# Patient Record
Sex: FEMALE | Race: WHITE | Employment: UNEMPLOYED | URBAN - METROPOLITAN AREA
[De-identification: names, ages, dates, MRNs, and addresses within clinical notes are randomized per-mention and may not be internally consistent; named-entity substitution may affect disease eponyms.]

---

## 2017-02-23 DIAGNOSIS — Z00.00 ANNUAL PHYSICAL EXAM: ICD-10-CM

## 2017-02-23 DIAGNOSIS — I10 ESSENTIAL HYPERTENSION, MALIGNANT: ICD-10-CM

## 2017-02-23 DIAGNOSIS — R01.1 HEART MURMUR: ICD-10-CM

## 2017-02-23 NOTE — TELEPHONE ENCOUNTER
Patient called in stating she needs an order for labs and a mammogram and also states she needs a reorder of there following medication   Requested Prescriptions     Pending Prescriptions Disp Refills    lisinopril (PRINIVIL, ZESTRIL) 10 mg tablet 90 Tab 0     Sig: Take 1 Tab by mouth daily.

## 2017-02-24 NOTE — TELEPHONE ENCOUNTER
Patient requesting the following medication refill     Medication requesting lisinopril (Prinivil) 10mg tablet    Date last prescribed 11/29/2016    QTY 90 Refills 0    Date patient last seen 08/01/2016    Follow up appt scheduled for 03/22/2017    Please advise: Advised patient that an appointment was needed in order to receive medication refill. Patient called back and scheduled a CP.

## 2017-02-25 RX ORDER — LISINOPRIL 10 MG/1
10 TABLET ORAL DAILY
Qty: 90 TAB | Refills: 0 | Status: SHIPPED | OUTPATIENT
Start: 2017-02-25 | End: 2017-05-31 | Stop reason: SDUPTHER

## 2017-05-31 DIAGNOSIS — Z00.00 ANNUAL PHYSICAL EXAM: ICD-10-CM

## 2017-05-31 DIAGNOSIS — I10 ESSENTIAL HYPERTENSION, MALIGNANT: ICD-10-CM

## 2017-05-31 DIAGNOSIS — R01.1 HEART MURMUR: ICD-10-CM

## 2017-05-31 RX ORDER — LISINOPRIL 10 MG/1
10 TABLET ORAL DAILY
Qty: 90 TAB | Refills: 0 | Status: SHIPPED | OUTPATIENT
Start: 2017-05-31 | End: 2017-07-24 | Stop reason: SDUPTHER

## 2017-05-31 NOTE — TELEPHONE ENCOUNTER
Patient requesting the following medication refill     Medication requesting Lisinopril 10mg    Date last prescribed 02/25/2017  QTY 90 Refills 0    Date patient last seen 08/01/2016    Follow up appt scheduled for 06/06/2017    Pt is overdue for an annual physical.    Please advise

## 2017-06-02 ENCOUNTER — HOSPITAL ENCOUNTER (OUTPATIENT)
Dept: MAMMOGRAPHY | Age: 52
Discharge: HOME | End: 2017-06-02
Attending: FAMILY MEDICINE
Payer: OTHER GOVERNMENT

## 2017-06-02 DIAGNOSIS — Z12.31 VISIT FOR SCREENING MAMMOGRAM: ICD-10-CM

## 2017-06-02 PROCEDURE — 77067 SCR MAMMO BI INCL CAD: CPT

## 2017-07-20 ENCOUNTER — HOSPITAL ENCOUNTER (OUTPATIENT)
Dept: LAB | Age: 52
Discharge: HOME | End: 2017-07-20
Payer: OTHER GOVERNMENT

## 2017-07-20 DIAGNOSIS — Z78.0 MENOPAUSE: Primary | ICD-10-CM

## 2017-07-20 DIAGNOSIS — R01.1 HEART MURMUR: ICD-10-CM

## 2017-07-20 DIAGNOSIS — I10 ESSENTIAL HYPERTENSION, MALIGNANT: ICD-10-CM

## 2017-07-20 LAB
ALBUMIN SERPL BCP-MCNC: 3.9 G/DL (ref 3.4–5)
ALBUMIN/GLOB SERPL: 1.2 {RATIO} (ref 0.8–1.7)
ALP SERPL-CCNC: 84 U/L (ref 45–117)
ALT SERPL-CCNC: 71 U/L (ref 13–56)
ANION GAP BLD CALC-SCNC: 9 MMOL/L (ref 3–18)
APPEARANCE UR: CLEAR
AST SERPL W P-5'-P-CCNC: 52 U/L (ref 15–37)
BACTERIA URNS QL MICRO: ABNORMAL /HPF
BASOPHILS # BLD AUTO: 0 K/UL (ref 0–0.06)
BASOPHILS # BLD: 0 % (ref 0–2)
BILIRUB SERPL-MCNC: 0.5 MG/DL (ref 0.2–1)
BILIRUB UR QL: NEGATIVE
BUN SERPL-MCNC: 13 MG/DL (ref 7–18)
BUN/CREAT SERPL: 20 (ref 12–20)
CALCIUM SERPL-MCNC: 9.1 MG/DL (ref 8.5–10.1)
CHLORIDE SERPL-SCNC: 103 MMOL/L (ref 100–108)
CHOLEST SERPL-MCNC: 223 MG/DL
CO2 SERPL-SCNC: 26 MMOL/L (ref 21–32)
COLOR UR: YELLOW
CREAT SERPL-MCNC: 0.66 MG/DL (ref 0.6–1.3)
DIFFERENTIAL METHOD BLD: ABNORMAL
EOSINOPHIL # BLD: 0.4 K/UL (ref 0–0.4)
EOSINOPHIL NFR BLD: 4 % (ref 0–5)
EPITH CASTS URNS QL MICRO: ABNORMAL /LPF (ref 0–5)
ERYTHROCYTE [DISTWIDTH] IN BLOOD BY AUTOMATED COUNT: 12.5 % (ref 11.6–14.5)
GLOBULIN SER CALC-MCNC: 3.3 G/DL (ref 2–4)
GLUCOSE SERPL-MCNC: 103 MG/DL (ref 74–99)
GLUCOSE UR STRIP.AUTO-MCNC: NEGATIVE MG/DL
HCT VFR BLD AUTO: 38.6 % (ref 35–45)
HDLC SERPL-MCNC: 49 MG/DL (ref 40–60)
HDLC SERPL: 4.6 {RATIO} (ref 0–5)
HGB BLD-MCNC: 12.5 G/DL (ref 12–16)
HGB UR QL STRIP: NEGATIVE
KETONES UR QL STRIP.AUTO: NEGATIVE MG/DL
LDLC SERPL CALC-MCNC: 132.4 MG/DL (ref 0–100)
LEUKOCYTE ESTERASE UR QL STRIP.AUTO: ABNORMAL
LIPID PROFILE,FLP: ABNORMAL
LYMPHOCYTES # BLD AUTO: 33 % (ref 21–52)
LYMPHOCYTES # BLD: 3.1 K/UL (ref 0.9–3.6)
MCH RBC QN AUTO: 32.6 PG (ref 24–34)
MCHC RBC AUTO-ENTMCNC: 32.4 G/DL (ref 31–37)
MCV RBC AUTO: 100.8 FL (ref 74–97)
MONOCYTES # BLD: 0.8 K/UL (ref 0.05–1.2)
MONOCYTES NFR BLD AUTO: 8 % (ref 3–10)
NEUTS SEG # BLD: 5.2 K/UL (ref 1.8–8)
NEUTS SEG NFR BLD AUTO: 55 % (ref 40–73)
NITRITE UR QL STRIP.AUTO: NEGATIVE
PH UR STRIP: 5 [PH] (ref 5–8)
PLATELET # BLD AUTO: 202 K/UL (ref 135–420)
PMV BLD AUTO: 10.8 FL (ref 9.2–11.8)
POTASSIUM SERPL-SCNC: 4.5 MMOL/L (ref 3.5–5.5)
PROT SERPL-MCNC: 7.2 G/DL (ref 6.4–8.2)
PROT UR STRIP-MCNC: NEGATIVE MG/DL
RBC # BLD AUTO: 3.83 M/UL (ref 4.2–5.3)
RBC #/AREA URNS HPF: 0 /HPF (ref 0–5)
SODIUM SERPL-SCNC: 138 MMOL/L (ref 136–145)
SP GR UR REFRACTOMETRY: 1.02 (ref 1–1.03)
TRIGL SERPL-MCNC: 208 MG/DL (ref ?–150)
TSH SERPL DL<=0.05 MIU/L-ACNC: 3.6 UIU/ML (ref 0.36–3.74)
UROBILINOGEN UR QL STRIP.AUTO: 0.2 EU/DL (ref 0.2–1)
VLDLC SERPL CALC-MCNC: 41.6 MG/DL
WBC # BLD AUTO: 9.5 K/UL (ref 4.6–13.2)
WBC URNS QL MICRO: ABNORMAL /HPF (ref 0–4)

## 2017-07-20 PROCEDURE — 85025 COMPLETE CBC W/AUTO DIFF WBC: CPT | Performed by: FAMILY MEDICINE

## 2017-07-20 PROCEDURE — 84443 ASSAY THYROID STIM HORMONE: CPT | Performed by: FAMILY MEDICINE

## 2017-07-20 PROCEDURE — 80053 COMPREHEN METABOLIC PANEL: CPT | Performed by: FAMILY MEDICINE

## 2017-07-20 PROCEDURE — 81001 URINALYSIS AUTO W/SCOPE: CPT | Performed by: FAMILY MEDICINE

## 2017-07-20 PROCEDURE — 80061 LIPID PANEL: CPT | Performed by: FAMILY MEDICINE

## 2017-07-20 PROCEDURE — 36415 COLL VENOUS BLD VENIPUNCTURE: CPT | Performed by: FAMILY MEDICINE

## 2017-07-24 ENCOUNTER — HOSPITAL ENCOUNTER (OUTPATIENT)
Dept: LAB | Age: 52
Discharge: HOME | End: 2017-07-24
Payer: OTHER GOVERNMENT

## 2017-07-24 ENCOUNTER — OFFICE VISIT (OUTPATIENT)
Dept: FAMILY MEDICINE CLINIC | Age: 52
End: 2017-07-24

## 2017-07-24 VITALS
HEIGHT: 65 IN | HEART RATE: 89 BPM | WEIGHT: 223 LBS | DIASTOLIC BLOOD PRESSURE: 88 MMHG | RESPIRATION RATE: 16 BRPM | BODY MASS INDEX: 37.15 KG/M2 | TEMPERATURE: 99.2 F | SYSTOLIC BLOOD PRESSURE: 149 MMHG | OXYGEN SATURATION: 99 %

## 2017-07-24 DIAGNOSIS — R01.1 HEART MURMUR: ICD-10-CM

## 2017-07-24 DIAGNOSIS — Z01.419 WELL WOMAN EXAM WITH ROUTINE GYNECOLOGICAL EXAM: ICD-10-CM

## 2017-07-24 DIAGNOSIS — I10 ESSENTIAL HYPERTENSION, MALIGNANT: ICD-10-CM

## 2017-07-24 DIAGNOSIS — Z00.00 ANNUAL PHYSICAL EXAM: Primary | ICD-10-CM

## 2017-07-24 PROCEDURE — 87625 HPV TYPES 16 & 18 ONLY: CPT | Performed by: FAMILY MEDICINE

## 2017-07-24 PROCEDURE — 88175 CYTOPATH C/V AUTO FLUID REDO: CPT | Performed by: FAMILY MEDICINE

## 2017-07-24 PROCEDURE — 87624 HPV HI-RISK TYP POOLED RSLT: CPT | Performed by: FAMILY MEDICINE

## 2017-07-24 RX ORDER — LISINOPRIL 10 MG/1
10 TABLET ORAL DAILY
Qty: 90 TAB | Refills: 0 | Status: SHIPPED | OUTPATIENT
Start: 2017-07-24 | End: 2017-11-30 | Stop reason: SDUPTHER

## 2017-07-24 NOTE — PROGRESS NOTES
James Shepherd is a 47 y.o. female presented to clinic for an annual exam with pap. Pt c/o 3/10 hip and back pain. Pt denies any other concerns at this time. 1. Have you been to the ER, urgent care clinic since your last visit? Hospitalized since your last visit? No    2. Have you seen or consulted any other health care providers outside of the [address] [place] [address] since your last visit? Include any pap smears or colon screening.  No     Learning Assessment 8/11/2015   PRIMARY LEARNER Patient   HIGHEST LEVEL OF EDUCATION - PRIMARY LEARNER  GRADUATED HIGH SCHOOL OR GED   BARRIERS PRIMARY LEARNER READING   CO-LEARNER CAREGIVER No   PRIMARY LANGUAGE ENGLISH   LEARNER PREFERENCE PRIMARY READING   ANSWERED BY self   RELATIONSHIP SELF

## 2017-07-24 NOTE — MR AVS SNAPSHOT
Visit Information Date & Time Provider Department Dept. Phone Encounter #  
 7/24/2017 12:30 PM Eugeniakhadijahwes [address] [phone] [id number] Follow-up Instructions Return in about 1 year (around 7/24/2018) for physical, labs at next visit, EKG next visit. Follow-up and Disposition History Upcoming Health Maintenance Date Due FOBT Q 1 YEAR AGE 50-75 5/27/2016 PAP AKA CERVICAL CYTOLOGY 5/1/2017 INFLUENZA AGE 9 TO ADULT 8/1/2017 BREAST CANCER SCRN MAMMOGRAM 6/2/2019 DTaP/Tdap/Td series (2 - Td) 10/4/2021 Allergies as of 7/24/2017  Review Complete On: 7/24/2017 By: Natty Mcdowell., DO Severity Noted Reaction Type Reactions Pcn [Penicillins] Low 01/13/2010   Side Effect Rash Current Immunizations  Reviewed on 10/25/2013 Name Date Influenza Vaccine 11/4/2014  3:20 PM, 10/25/2013 Influenza Vaccine (Quad) PF 11/11/2015 Influenza Vaccine Split 10/3/2012, 10/4/2011, 11/2/2010 TDAP Vaccine 10/4/2011 Not reviewed this visit You Were Diagnosed With   
  
 Codes Comments Annual physical exam    -  Primary ICD-10-CM: Z00.00 ICD-9-CM: V70.0 Essential hypertension, malignant     ICD-10-CM: I10 
ICD-9-CM: 401.0 Heart murmur     ICD-10-CM: R01.1 ICD-9-CM: 185. 2 Well woman exam with routine gynecological exam     ICD-10-CM: D29.522 ICD-9-CM: V72.31 Vitals BP Pulse Temp Resp Height(growth percentile) Weight(growth percentile) 149/88 (BP 1 Location: Left arm, BP Patient Position: Sitting) 89 99.2 °F (37.3 °C) (Oral) 16 5' 5\" (1.651 m) 223 lb (101.2 kg) SpO2 BMI OB Status Smoking Status 99% 37.11 kg/m2 Perimenopausal Never Smoker BMI and BSA Data Body Mass Index Body Surface Area  
 37.11 kg/m 2 2.15 m 2 Preferred Pharmacy Pharmacy Name Phone Katya Fant[facility] [address] [facility]. Your Updated Medication List  
  
   
This list is accurate as of: 7/24/17  1:17 PM.  Always use your most recent med list.  
  
  
  
  
 lisinopril 10 mg tablet Commonly known as:  Vimal Elissa Take 1 Tab by mouth daily. Prescriptions Sent to Pharmacy Refills  
 lisinopril (PRINIVIL, ZESTRIL) 10 mg tablet 0 Sig: Take 1 Tab by mouth daily. Class: Normal  
 Pharmacy: Miroslava Marlyn [address] [place] [address], [address] [place] [address] #: [phone] Route: Oral  
  
We Performed the Following AMB POC EKG ROUTINE W/ 12 LEADS, INTER & REP [48053 CPT(R)] REFERRAL TO GASTROENTEROLOGY [FVC85 Custom] Comments:  
 Please evaluate patient for routine colnoscopy. 1st available is fine. Follow-up Instructions Return in about 1 year (around 7/24/2018) for physical, labs at next visit, EKG next visit. To-Do List   
 07/24/2017 Lab:  CT+NG+TV+HSV BY JACIEL   
  
 07/24/2017 Pathology:  PAP IG, APTIMA HPV AND RFX 16/18,45 ([id number]) Referral Information Referral ID Referred By Referred To  
  
 [id number] Carlo Carrera, [address] [place] [address], [facility] [address] [facility] Phone: [phone] Fax: [phone] Visits Status Start Date End Date 1 New Request 7/24/17 7/24/18 If your referral has a status of pending review or denied, additional information will be sent to support the outcome of this decision. Introducing [facility]! Dear Adenike Goldman: Thank you for requesting a CM Sistemi account. Our records indicate that you already have an active CM Sistemi account. You can access your account anytime at [email or web address] Did you know that you can access your hospital and ER discharge instructions at any time in CM Sistemi? You can also review all of your test results from your hospital stay or ER visit. Additional Information If you have questions, please visit the Frequently Asked Questions section of the Hojokihart website at [email or web address] Remember, Tongbanjie is NOT to be used for urgent needs. For medical emergencies, dial 911. Now available from your iPhone and Android! Please provide this summary of care documentation to your next provider. Your primary care clinician is listed as [id number] [place] [address]. If you have any questions after today's visit, please call [phone].

## 2017-07-24 NOTE — PROGRESS NOTES
Yousif Ellison is a 47 y.o.  female and presents for a preventive health care visit   Subjective:  Health Maintenance History  Immunizations reviewed, none indicated. Mammogram :utd , dexascan: na ,pap smear : today ,   colonoscopy:never done, Eye exam: utd ,  Chest CT: na ,    HPI ;    Patient Active Problem List    Diagnosis Date Noted    Menopause 08/18/2015    Essential hypertension, malignant 10/03/2012    Screening for malignant neoplasm of the cervix 10/04/2011    Heart murmur 01/13/2010     Current Outpatient Prescriptions   Medication Sig Dispense Refill    lisinopril (PRINIVIL, ZESTRIL) 10 mg tablet Take 1 Tab by mouth daily.  90 Tab 0     Allergies   Allergen Reactions    Pcn [Penicillins] Rash     Past Medical History:   Diagnosis Date    Essential hypertension, malignant 10/3/2012    Grandparents with Hx Breast, Prostate, Uterine and Thyroid Cancers 1/13/2010    Hx of Abnormal Pap distant 1/13/2010     Past Surgical History:   Procedure Laterality Date    HX DILATION AND CURETTAGE  4/2000 secondary to uterine fibroids    HX ORTHOPAEDIC  S/p L ankle Fx 1/2001     Family History   Problem Relation Age of Onset    Breast Cancer Paternal Grandmother 62     57s    Diabetes Mother     Heart Disease Father     Hypertension Father      Social History   Substance Use Topics    Smoking status: Never Smoker    Smokeless tobacco: Never Used    Alcohol use 0.0 oz/week     0 Standard drinks or equivalent per week       ROS       General: negative for - chills, fatigue, fever, weight change  Psych: negative for - anxiety, depression, irritability or mood swings  ENT: negative for - headaches, hearing change, nasal congestion, oral lesions, sneezing or sore throat  Heme/ Lymph: negative for - bleeding problems, bruising, pallor or swollen lymph nodes  Endo: negative for - hot flashes, polydipsia/polyuria or temperature intolerance  Resp: negative for - cough, shortness of breath or wheezing  CV: negative for - chest pain, edema or palpitations  GI: negative for - abdominal pain, change in bowel habits, constipation, diarrhea or nausea/vomiting  : negative for - dysuria, hematuria, incontinence, pelvic pain or vulvar/vaginal symptoms  MSK: negative for - joint pain, joint swelling or muscle pain  Neuro: negative for - confusion, headaches, seizures or weakness  Derm: negative for - dry skin, hair changes, rash or skin lesion changes      Objective:  Vitals:    07/24/17 1245   BP: 149/88   Pulse: 89   Resp: 16   Temp: 99.2 °F (37.3 °C)   TempSrc: Oral   SpO2: 99%   Weight: 223 lb (101.2 kg)   Height: 5' 5\" (1.651 m)   PainSc:   3   PainLoc: Hip       alert, well appearing, and in no distress, oriented to person, place, and time and normal appearing weight  Mental status - alert, oriented to person, place, and time  Eyes - pupils equal and reactive, extraocular eye movements intact  Ears - bilateral TM's and external ear canals normal  Nose - normal and patent, no erythema, discharge or polyps  Mouth - mucous membranes moist, pharynx normal without lesions  Neck - supple, no significant adenopathy  Lymphatics - no palpable lymphadenopathy, no hepatosplenomegaly  Chest - clear to auscultation, no wheezes, rales or rhonchi, symmetric air entry  Heart - normal rate, regular rhythm, normal S1, S2, no murmurs, rubs, clicks or gallops  Abdomen - soft, nontender, nondistended, no masses or organomegaly  Breasts - breasts appear normal, no suspicious masses, no skin or nipple changes or axillary nodes  Pelvic - normal external genitalia, vulva, vagina, cervix, uterus and adnexa  Rectal - normal rectal, no masses  Back exam - full range of motion, no tenderness, palpable spasm or pain on motion  Neurological - alert, oriented, normal speech, no focal findings or movement disorder noted  Musculoskeletal - no joint tenderness, deformity or swelling  Extremities - peripheral pulses normal, no pedal edema, no clubbing or cyanosis  Skin - normal coloration and turgor, no rashes, no suspicious skin lesions noted        LABS  Component      Latest Ref Rng & Units 7/20/2017 7/20/2017 7/20/2017 7/20/2017           8:49 AM  8:49 AM  8:49 AM  8:49 AM   WBC      4.6 - 13.2 K/uL       RBC      4.20 - 5.30 M/uL       HGB      12.0 - 16.0 g/dL       HCT      35.0 - 45.0 %       MCV      74.0 - 97.0 FL       MCH      24.0 - 34.0 PG       MCHC      31.0 - 37.0 g/dL       RDW      11.6 - 14.5 %       PLATELET      954 - 225 K/uL       MPV      9.2 - 11.8 FL       NEUTROPHILS      40 - 73 %       LYMPHOCYTES      21 - 52 %       MONOCYTES      3 - 10 %       EOSINOPHILS      0 - 5 %       BASOPHILS      0 - 2 %       ABS. NEUTROPHILS      1.8 - 8.0 K/UL       ABS. LYMPHOCYTES      0.9 - 3.6 K/UL       ABS. MONOCYTES      0.05 - 1.2 K/UL       ABS. EOSINOPHILS      0.0 - 0.4 K/UL       ABS. BASOPHILS      0.0 - 0.06 K/UL       DF             Sodium      136 - 145 mmol/L   138    Potassium      3.5 - 5.5 mmol/L   4.5    Chloride      100 - 108 mmol/L   103    CO2      21 - 32 mmol/L   26    Anion gap      3.0 - 18 mmol/L   9    Glucose      74 - 99 mg/dL   103 (H)    BUN      7.0 - 18 MG/DL   13    Creatinine      0.6 - 1.3 MG/DL   0.66    BUN/Creatinine ratio      12 - 20     20    GFR est AA      >60 ml/min/1.73m2   >60    GFR est non-AA      >60 ml/min/1.73m2   >60    Calcium      8.5 - 10.1 MG/DL   9.1    Bilirubin, total      0.2 - 1.0 MG/DL   0.5    ALT (SGPT)      13 - 56 U/L   71 (H)    AST      15 - 37 U/L   52 (H)    Alk.  phosphatase      45 - 117 U/L   84    Protein, total      6.4 - 8.2 g/dL   7.2    Albumin      3.4 - 5.0 g/dL   3.9    Globulin      2.0 - 4.0 g/dL   3.3    A-G Ratio      0.8 - 1.7     1.2    Color       YELLOW      Appearance       CLEAR      Specific gravity      1.005 - 1.030   1.023      pH (UA)      5.0 - 8.0   5.0      Protein      NEG mg/dL NEGATIVE      Glucose      NEG mg/dL NEGATIVE      Ketone NEG mg/dL NEGATIVE      Bilirubin      NEG   NEGATIVE      Blood      NEG   NEGATIVE      Urobilinogen      0.2 - 1.0 EU/dL 0.2      Nitrites      NEG   NEGATIVE      Leukocyte Esterase      NEG   MODERATE (A)      Cholesterol, total      <200 MG/DL    223 (H)   Triglyceride      <150 MG/DL    208 (H)   HDL Cholesterol      40 - 60 MG/DL    49   LDL, calculated      0 - 100 MG/DL    132.4 (H)   VLDL, calculated      MG/DL    41.6   CHOL/HDL Ratio      0 - 5.0      4.6   TSH      0.36 - 3.74 uIU/mL  3.60       Component      Latest Ref Rng & Units 7/20/2017           8:49 AM   WBC      4.6 - 13.2 K/uL 9.5   RBC      4.20 - 5.30 M/uL 3.83 (L)   HGB      12.0 - 16.0 g/dL 12.5   HCT      35.0 - 45.0 % 38.6   MCV      74.0 - 97.0 .8 (H)   MCH      24.0 - 34.0 PG 32.6   MCHC      31.0 - 37.0 g/dL 32.4   RDW      11.6 - 14.5 % 12.5   PLATELET      285 - 517 K/uL 202   MPV      9.2 - 11.8 FL 10.8   NEUTROPHILS      40 - 73 % 55   LYMPHOCYTES      21 - 52 % 33   MONOCYTES      3 - 10 % 8   EOSINOPHILS      0 - 5 % 4   BASOPHILS      0 - 2 % 0   ABS. NEUTROPHILS      1.8 - 8.0 K/UL 5.2   ABS. LYMPHOCYTES      0.9 - 3.6 K/UL 3.1   ABS. MONOCYTES      0.05 - 1.2 K/UL 0.8   ABS. EOSINOPHILS      0.0 - 0.4 K/UL 0.4   ABS. BASOPHILS      0.0 - 0.06 K/UL 0.0   DF       AUTOMATED   Sodium      136 - 145 mmol/L    Potassium      3.5 - 5.5 mmol/L    Chloride      100 - 108 mmol/L    CO2      21 - 32 mmol/L    Anion gap      3.0 - 18 mmol/L    Glucose      74 - 99 mg/dL    BUN      7.0 - 18 MG/DL    Creatinine      0.6 - 1.3 MG/DL    BUN/Creatinine ratio      12 - 20      GFR est AA      >60 ml/min/1.73m2    GFR est non-AA      >60 ml/min/1.73m2    Calcium      8.5 - 10.1 MG/DL    Bilirubin, total      0.2 - 1.0 MG/DL    ALT (SGPT)      13 - 56 U/L    AST      15 - 37 U/L    Alk.  phosphatase      45 - 117 U/L    Protein, total      6.4 - 8.2 g/dL    Albumin      3.4 - 5.0 g/dL    Globulin      2.0 - 4.0 g/dL    A-G Ratio 0.8 - 1.7      Color          Appearance          Specific gravity      1.005 - 1.030      pH (UA)      5.0 - 8.0      Protein      NEG mg/dL    Glucose      NEG mg/dL    Ketone      NEG mg/dL    Bilirubin      NEG      Blood      NEG      Urobilinogen      0.2 - 1.0 EU/dL    Nitrites      NEG      Leukocyte Esterase      NEG      Cholesterol, total      <200 MG/DL    Triglyceride      <150 MG/DL    HDL Cholesterol      40 - 60 MG/DL    LDL, calculated      0 - 100 MG/DL    VLDL, calculated      MG/DL    CHOL/HDL Ratio      0 - 5.0      TSH      0.36 - 3.74 uIU/mL      TESTS  ekg  nsr      Assessment/Plan:    Health Maintenance up to date. Recommend f/u physical 1 year. Routine screening labs/tests recommended prior to next physical.              I have discussed the diagnosis with the patient and the intended plan as seen in the above orders. The patient has received an after-visit summary and questions were answered concerning future plans. I have discussed medication side effects and warnings with the patient as well. I have reviewed the plan of care with the patient, accepted their input and they are in agreement with the treatment goals. Follow-up Disposition:  Return in about 1 year (around 7/24/2018) for physical, labs at next visit, EKG next visit.        -------------------------------------------------------------------------------------------------------------------    Problem Assessment  and also with   Chief Complaint   Patient presents with    Hypertension         HPI ;  Cardiovascular Review:  The patient has hypertension. Diet and Lifestyle: not attempting to follow a low fat, low cholesterol diet, not attempting to follow a low sodium diet  Home BP Monitoring: is not measured at home. Pertinent ROS: taking medications as instructed, no medication side effects noted, no TIA's, no chest pain on exertion, no dyspnea on exertion, no swelling of ankles.      Additional Concerns: Assessment/Plan:      Hypertension - stable  Diagnoses and all orders for this visit:    1. Annual physical exam  -     AMB POC EKG ROUTINE W/ 12 LEADS, INTER & REP  -     PAP IG, APTIMA HPV AND RFX 16/18,45 ([id number]); Future  -     CT+NG+TV+HSV BY JACIEL; Future  -     lisinopril (PRINIVIL, ZESTRIL) 10 mg tablet; Take 1 Tab by mouth daily.  -     REFERRAL TO GASTROENTEROLOGY    2. Essential hypertension, malignant  -     AMB POC EKG ROUTINE W/ 12 LEADS, INTER & REP  -     PAP IG, APTIMA HPV AND RFX 16/18,45 ([id number]); Future  -     CT+NG+TV+HSV BY JACIEL; Future  -     lisinopril (PRINIVIL, ZESTRIL) 10 mg tablet; Take 1 Tab by mouth daily.  -     REFERRAL TO GASTROENTEROLOGY    3. Heart murmur  -     AMB POC EKG ROUTINE W/ 12 LEADS, INTER & REP  -     PAP IG, APTIMA HPV AND RFX 16/18,45 ([id number]); Future  -     CT+NG+TV+HSV BY JACIEL; Future  -     lisinopril (PRINIVIL, ZESTRIL) 10 mg tablet; Take 1 Tab by mouth daily.  -     REFERRAL TO GASTROENTEROLOGY    4. Well woman exam with routine gynecological exam  -     AMB POC EKG ROUTINE W/ 12 LEADS, INTER & REP  -     PAP IG, APTIMA HPV AND RFX 16/18,45 ([id number]); Future  -     CT+NG+TV+HSV BY JACIEL; Future  -     lisinopril (PRINIVIL, ZESTRIL) 10 mg tablet; Take 1 Tab by mouth daily.  -     REFERRAL TO GASTROENTEROLOGY    note for contraception she has IUD place 2/2016. She is still having menstrual periods and thus fertile. Will f/u 1 yr.           Lab review: labs are reviewed, up to date and normal

## 2017-07-27 LAB
C TRACH RRNA SPEC QL NAA+PROBE: NEGATIVE
HSV1 DNA SPEC QL NAA+PROBE: NEGATIVE
HSV2 DNA SPEC QL NAA+PROBE: NEGATIVE
N GONORRHOEA RRNA SPEC QL NAA+PROBE: NEGATIVE
T VAGINALIS RRNA SPEC QL NAA+PROBE: NEGATIVE

## 2017-10-31 ENCOUNTER — OFFICE VISIT (OUTPATIENT)
Dept: FAMILY MEDICINE CLINIC | Age: 52
End: 2017-10-31

## 2017-10-31 ENCOUNTER — HOSPITAL ENCOUNTER (OUTPATIENT)
Dept: LAB | Age: 52
Discharge: HOME | End: 2017-10-31
Payer: OTHER GOVERNMENT

## 2017-10-31 ENCOUNTER — TELEPHONE (OUTPATIENT)
Dept: FAMILY MEDICINE CLINIC | Age: 52
End: 2017-10-31

## 2017-10-31 DIAGNOSIS — R10.2 PELVIC PAIN: Primary | ICD-10-CM

## 2017-10-31 DIAGNOSIS — R10.32 LEFT LOWER QUADRANT PAIN: ICD-10-CM

## 2017-10-31 DIAGNOSIS — R10.2 PELVIC PAIN: ICD-10-CM

## 2017-10-31 LAB
ALBUMIN SERPL-MCNC: 3.6 G/DL (ref 3.4–5)
ALBUMIN/GLOB SERPL: 1 {RATIO} (ref 0.8–1.7)
ALP SERPL-CCNC: 114 U/L (ref 45–117)
ALT SERPL-CCNC: 69 U/L (ref 13–56)
ANION GAP SERPL CALC-SCNC: 9 MMOL/L (ref 3–18)
APPEARANCE UR: CLEAR
AST SERPL-CCNC: 38 U/L (ref 15–37)
BACTERIA URNS QL MICRO: NEGATIVE /HPF
BASOPHILS # BLD: 0 K/UL (ref 0–0.06)
BASOPHILS NFR BLD: 0 % (ref 0–2)
BILIRUB SERPL-MCNC: 0.4 MG/DL (ref 0.2–1)
BILIRUB UR QL: NEGATIVE
BUN SERPL-MCNC: 8 MG/DL (ref 7–18)
BUN/CREAT SERPL: 11 (ref 12–20)
CALCIUM SERPL-MCNC: 8.7 MG/DL (ref 8.5–10.1)
CHLORIDE SERPL-SCNC: 102 MMOL/L (ref 100–108)
CO2 SERPL-SCNC: 27 MMOL/L (ref 21–32)
COLOR UR: YELLOW
CREAT SERPL-MCNC: 0.73 MG/DL (ref 0.6–1.3)
DIFFERENTIAL METHOD BLD: ABNORMAL
EOSINOPHIL # BLD: 0.4 K/UL (ref 0–0.4)
EOSINOPHIL NFR BLD: 3 % (ref 0–5)
EPITH CASTS URNS QL MICRO: NORMAL /LPF (ref 0–5)
ERYTHROCYTE [DISTWIDTH] IN BLOOD BY AUTOMATED COUNT: 12.4 % (ref 11.6–14.5)
GLOBULIN SER CALC-MCNC: 3.7 G/DL (ref 2–4)
GLUCOSE SERPL-MCNC: 112 MG/DL (ref 74–99)
GLUCOSE UR STRIP.AUTO-MCNC: NEGATIVE MG/DL
HCG SERPL QL: NEGATIVE
HCT VFR BLD AUTO: 38.8 % (ref 35–45)
HGB BLD-MCNC: 12.3 G/DL (ref 12–16)
HGB UR QL STRIP: NEGATIVE
KETONES UR QL STRIP.AUTO: NEGATIVE MG/DL
LEUKOCYTE ESTERASE UR QL STRIP.AUTO: ABNORMAL
LYMPHOCYTES # BLD: 3 K/UL (ref 0.9–3.6)
LYMPHOCYTES NFR BLD: 25 % (ref 21–52)
MCH RBC QN AUTO: 32.2 PG (ref 24–34)
MCHC RBC AUTO-ENTMCNC: 31.7 G/DL (ref 31–37)
MCV RBC AUTO: 101.6 FL (ref 74–97)
MONOCYTES # BLD: 0.9 K/UL (ref 0.05–1.2)
MONOCYTES NFR BLD: 7 % (ref 3–10)
NEUTS SEG # BLD: 7.7 K/UL (ref 1.8–8)
NEUTS SEG NFR BLD: 65 % (ref 40–73)
NITRITE UR QL STRIP.AUTO: NEGATIVE
PH UR STRIP: 5.5 [PH] (ref 5–8)
PLATELET # BLD AUTO: 252 K/UL (ref 135–420)
PMV BLD AUTO: 11.4 FL (ref 9.2–11.8)
POTASSIUM SERPL-SCNC: 4 MMOL/L (ref 3.5–5.5)
PROT SERPL-MCNC: 7.3 G/DL (ref 6.4–8.2)
PROT UR STRIP-MCNC: NEGATIVE MG/DL
RBC # BLD AUTO: 3.82 M/UL (ref 4.2–5.3)
RBC #/AREA URNS HPF: 0 /HPF (ref 0–5)
SODIUM SERPL-SCNC: 138 MMOL/L (ref 136–145)
SP GR UR REFRACTOMETRY: 1.01 (ref 1–1.03)
UROBILINOGEN UR QL STRIP.AUTO: 0.2 EU/DL (ref 0.2–1)
WBC # BLD AUTO: 11.9 K/UL (ref 4.6–13.2)
WBC URNS QL MICRO: NORMAL /HPF (ref 0–4)

## 2017-10-31 PROCEDURE — 87086 URINE CULTURE/COLONY COUNT: CPT | Performed by: FAMILY MEDICINE

## 2017-10-31 PROCEDURE — 36415 COLL VENOUS BLD VENIPUNCTURE: CPT | Performed by: FAMILY MEDICINE

## 2017-10-31 PROCEDURE — 84703 CHORIONIC GONADOTROPIN ASSAY: CPT | Performed by: FAMILY MEDICINE

## 2017-10-31 PROCEDURE — 80053 COMPREHEN METABOLIC PANEL: CPT | Performed by: FAMILY MEDICINE

## 2017-10-31 PROCEDURE — 85025 COMPLETE CBC W/AUTO DIFF WBC: CPT | Performed by: FAMILY MEDICINE

## 2017-10-31 PROCEDURE — 81001 URINALYSIS AUTO W/SCOPE: CPT | Performed by: FAMILY MEDICINE

## 2017-10-31 RX ORDER — HYDROCODONE BITARTRATE AND ACETAMINOPHEN 5; 325 MG/1; MG/1
1 TABLET ORAL
Qty: 15 TAB | Refills: 0 | Status: SHIPPED | OUTPATIENT
Start: 2017-10-31 | End: 2017-11-30 | Stop reason: SDUPTHER

## 2017-10-31 RX ORDER — NAPROXEN SODIUM 550 MG/1
550 TABLET ORAL 2 TIMES DAILY WITH MEALS
Qty: 10 TAB | Refills: 1 | Status: SHIPPED | OUTPATIENT
Start: 2017-10-31 | End: 2017-11-30 | Stop reason: SDUPTHER

## 2017-10-31 NOTE — TELEPHONE ENCOUNTER
Spoke with patient to recommend that she have the requested ultrasound done at a [facility] as she would not need a prior authorization or we can move forward with obtaining the prior authorization with Judith in which she can keep the scheduled appointment on 11/10/17. Patient agreed to wait for the scheduled appointment on 11/10/17 at [facility].

## 2017-10-31 NOTE — MR AVS SNAPSHOT
Visit Information Date & Time Provider Department Dept. Phone Encounter #  
 10/31/2017 10:30 AM Ese Marrero [phone] [id number] Follow-up Instructions Return in about 2 days (around 11/2/2017) for rov, xrays prior, labs prior. Upcoming Health Maintenance Date Due FOBT Q 1 YEAR AGE 50-75 5/27/2016 INFLUENZA AGE 9 TO ADULT 8/1/2017 BREAST CANCER SCRN MAMMOGRAM 6/2/2019 PAP AKA CERVICAL CYTOLOGY 7/24/2020 DTaP/Tdap/Td series (2 - Td) 10/4/2021 Allergies as of 10/31/2017  Review Complete On: 7/24/2017 By: Meek Padilla., DO Severity Noted Reaction Type Reactions Pcn [Penicillins] Low 01/13/2010   Side Effect Rash Current Immunizations  Reviewed on 10/25/2013 Name Date Influenza Vaccine 11/4/2014  3:20 PM, 10/25/2013 Influenza Vaccine (Quad) PF 11/11/2015 Influenza Vaccine Split 10/3/2012, 10/4/2011, 11/2/2010 TDAP Vaccine 10/4/2011 Not reviewed this visit You Were Diagnosed With   
  
 Codes Comments Pelvic pain    -  Primary ICD-10-CM: R10.2 ICD-9-CM: GPI7861 Left lower quadrant pain     ICD-10-CM: R10.32 
ICD-9-CM: 789.04 Vitals OB Status Smoking Status Perimenopausal Never Smoker Preferred Pharmacy Pharmacy Name Phone Radha Hives [address] [facility] 97. Your Updated Medication List  
  
   
This list is accurate as of: 10/31/17 10:35 AM.  Always use your most recent med list.  
  
  
  
  
 HYDROcodone-acetaminophen 5-325 mg per tablet Commonly known as:  Jay Campos Take 1 Tab by mouth every eight (8) hours as needed for Pain. Max Daily Amount: 3 Tabs. lisinopril 10 mg tablet Commonly known as:  Alexander Quintana Take 1 Tab by mouth daily. naproxen sodium 550 mg tablet Commonly known as:  Maxine Miracle Take 1 Tab by mouth two (2) times daily (with meals). Prescriptions Printed Refills HYDROcodone-acetaminophen (NORCO) 5-325 mg per tablet 0 Sig: Take 1 Tab by mouth every eight (8) hours as needed for Pain. Max Daily Amount: 3 Tabs. Class: Print Route: Oral  
  
Prescriptions Sent to Pharmacy Refills  
 naproxen sodium (ANAPROX) 550 mg tablet 1 Sig: Take 1 Tab by mouth two (2) times daily (with meals). Class: Normal  
 Pharmacy: Sheba Guthrie [address] [place] [address], [address] [facility] #: [phone] Route: Oral  
  
Follow-up Instructions Return in about 2 days (around 11/2/2017) for rov, xrays prior, labs prior. To-Do List   
 10/31/2017 Lab:  CBC WITH AUTOMATED DIFF   
  
 10/31/2017 Microbiology:  CULTURE, URINE   
  
 10/31/2017 Lab:  HCG QL SERUM   
  
 10/31/2017 Lab:  METABOLIC PANEL, COMPREHENSIVE   
  
 10/31/2017 Lab:  URINALYSIS W/ RFLX MICROSCOPIC   
  
 10/31/2017 Imaging:  US ABD LTD   
  
 10/31/2017 Imaging:  US PELV NON OBS Introducing [facility]! Dear Hang Melendrez: Thank you for requesting a Zoodles account. Our records indicate that you already have an active Zoodles account. You can access your account anytime at [email or web address] Systems Did you know that you can access your hospital and ER discharge instructions at any time in Zoodles? You can also review all of your test results from your hospital stay or ER visit. Additional Information If you have questions, please visit the Frequently Asked Questions section of the Zoodles website at [email or web address] Remember, Zoodles is NOT to be used for urgent needs. For medical emergencies, dial 911. Now available from your iPhone and Android! Please provide this summary of care documentation to your next provider. Your primary care clinician is listed as [address] [facility] [address]. If you have any questions after today's visit, please call [phone].

## 2017-10-31 NOTE — PROGRESS NOTES
Loki Sow is a 47 y.o.  female and presents with    Chief Complaint   Patient presents with    Abdominal Pain           Subjective: Additional Concerns: gradual onset and worsening of LLQ abd and pelvic pain. No menses x 5 mo. Has IUD  No NVDC. No fever or chills. Patient Active Problem List    Diagnosis Date Noted    Menopause 08/18/2015    Essential hypertension, malignant 10/03/2012    Screening for malignant neoplasm of the cervix 10/04/2011    Heart murmur 01/13/2010     Current Outpatient Prescriptions   Medication Sig Dispense Refill    naproxen sodium (ANAPROX) 550 mg tablet Take 1 Tab by mouth two (2) times daily (with meals). 10 Tab 1    HYDROcodone-acetaminophen (NORCO) 5-325 mg per tablet Take 1 Tab by mouth every eight (8) hours as needed for Pain. Max Daily Amount: 3 Tabs. 15 Tab 0    lisinopril (PRINIVIL, ZESTRIL) 10 mg tablet Take 1 Tab by mouth daily. 90 Tab 0     Allergies   Allergen Reactions    Pcn [Penicillins] Rash     Past Medical History:   Diagnosis Date    Essential hypertension, malignant 10/3/2012    Grandparents with Hx Breast, Prostate, Uterine and Thyroid Cancers 1/13/2010    Hx of Abnormal Pap distant 1/13/2010     Past Surgical History:   Procedure Laterality Date    HX DILATION AND CURETTAGE  4/2000 secondary to uterine fibroids    HX ORTHOPAEDIC  S/p L ankle Fx 1/2001     Family History   Problem Relation Age of Onset    Breast Cancer Paternal Grandmother 62     57s    Diabetes Mother     Heart Disease Father     Hypertension Father      Social History   Substance Use Topics    Smoking status: Never Smoker    Smokeless tobacco: Never Used    Alcohol use 0.0 oz/week     0 Standard drinks or equivalent per week       ROS       All other systems reviewed and are negative. Objective: There were no vitals filed for this visit.                alert, well appearing, and in no distress, oriented to person, place, and time and normal appearing weight  Heart - normal rate, regular rhythm, normal S1, S2, no murmurs, rubs, clicks or gallops  Abdomen - tenderness noted LLQ. No masses , rebound, guarding, rigidity         LABS     TESTS      Assessment/Plan:    llq abd and pelvic pain. Check us, labs  Try anaprox and vicodin and rest and f/u  2 days    Lab review:     Diagnoses and all orders for this visit:    1. Pelvic pain  -     US ABD LTD; Future  -     US PELV NON OBS; Future  -     CBC WITH AUTOMATED DIFF; Future  -     METABOLIC PANEL, COMPREHENSIVE; Future  -     URINALYSIS W/ RFLX MICROSCOPIC; Future  -     CULTURE, URINE; Future  -     HCG QL SERUM; Future    2. Left lower quadrant pain  -     US ABD LTD; Future  -     US PELV NON OBS; Future  -     CBC WITH AUTOMATED DIFF; Future  -     METABOLIC PANEL, COMPREHENSIVE; Future  -     URINALYSIS W/ RFLX MICROSCOPIC; Future  -     CULTURE, URINE; Future  -     HCG QL SERUM; Future    Other orders  -     naproxen sodium (ANAPROX) 550 mg tablet; Take 1 Tab by mouth two (2) times daily (with meals). -     HYDROcodone-acetaminophen (NORCO) 5-325 mg per tablet; Take 1 Tab by mouth every eight (8) hours as needed for Pain. Max Daily Amount: 3 Tabs. I have discussed the diagnosis with the patient and the intended plan as seen in the above orders. The patient has received an after-visit summary and questions were answered concerning future plans. I have discussed medication side effects and warnings with the patient as well. I have reviewed the plan of care with the patient, accepted their input and they are in agreement with the treatment goals. Follow-up Disposition:  Return in about 2 days (around 11/2/2017) for rov, xrays prior, labs prior.

## 2017-10-31 NOTE — TELEPHONE ENCOUNTER
Pt called stating that she needs a prior auth for her ultra sound and [facility] can't get her in to do one until 11/10. Please advise.

## 2017-11-01 LAB
BACTERIA SPEC CULT: NORMAL
SERVICE CMNT-IMP: NORMAL

## 2017-11-10 ENCOUNTER — HOSPITAL ENCOUNTER (OUTPATIENT)
Dept: ULTRASOUND IMAGING | Age: 52
Discharge: HOME | End: 2017-11-10
Attending: FAMILY MEDICINE
Payer: OTHER GOVERNMENT

## 2017-11-10 DIAGNOSIS — R10.2 PELVIC PAIN: ICD-10-CM

## 2017-11-10 DIAGNOSIS — R10.32 LEFT LOWER QUADRANT PAIN: ICD-10-CM

## 2017-11-10 PROCEDURE — 76705 ECHO EXAM OF ABDOMEN: CPT

## 2017-11-10 PROCEDURE — 76830 TRANSVAGINAL US NON-OB: CPT

## 2017-11-27 ENCOUNTER — TELEPHONE (OUTPATIENT)
Dept: FAMILY MEDICINE CLINIC | Age: 52
End: 2017-11-27

## 2017-11-27 ENCOUNTER — DOCUMENTATION ONLY (OUTPATIENT)
Dept: FAMILY MEDICINE CLINIC | Age: 52
End: 2017-11-27

## 2017-11-27 NOTE — PROGRESS NOTES
Dr. Mathewvive Abdulkadir patient was a no show on 11/27/17. Letter #1 was sent on 11/27/17. NADEGE STEELE.

## 2017-11-27 NOTE — LETTER
11/27/2017 Kath Hung [address] [facility] Dear Ms. Kath Hung, We had an appointment reserved for you today and were concerned when you did not show or call within 24 hours to cancel the appointment. Our policy is to call patients two days prior to their appointment to remind them of the date and time. We perform these calls as a courtesy to our patients and to allow us the opportunity to rebook the time slot should the appointment not be necessary. Recognizing that everyones time is valuable and that appointment time is limited, we ask that you provide 24 hours notice if you are unable to keep your appointment. Please call us at your earliest convenience to reschedule your appointment as your provider felt it was important to see you. Thank you for your anticipated cooperation. [address] [place] [address] [id number] [phone]

## 2017-11-30 ENCOUNTER — OFFICE VISIT (OUTPATIENT)
Dept: FAMILY MEDICINE CLINIC | Age: 52
End: 2017-11-30

## 2017-11-30 VITALS
WEIGHT: 234.6 LBS | DIASTOLIC BLOOD PRESSURE: 80 MMHG | HEIGHT: 65 IN | BODY MASS INDEX: 39.09 KG/M2 | TEMPERATURE: 97.4 F | SYSTOLIC BLOOD PRESSURE: 112 MMHG | RESPIRATION RATE: 12 BRPM | HEART RATE: 96 BPM | OXYGEN SATURATION: 99 %

## 2017-11-30 DIAGNOSIS — I10 ESSENTIAL HYPERTENSION, MALIGNANT: Primary | ICD-10-CM

## 2017-11-30 DIAGNOSIS — R10.84 GENERALIZED ABDOMINAL PAIN: ICD-10-CM

## 2017-11-30 DIAGNOSIS — R01.1 HEART MURMUR: ICD-10-CM

## 2017-11-30 RX ORDER — NAPROXEN SODIUM 550 MG/1
550 TABLET ORAL 2 TIMES DAILY WITH MEALS
Qty: 10 TAB | Refills: 1 | Status: SHIPPED | OUTPATIENT
Start: 2017-11-30 | End: 2018-12-06

## 2017-11-30 RX ORDER — LISINOPRIL 10 MG/1
10 TABLET ORAL DAILY
Qty: 90 TAB | Refills: 4 | Status: SHIPPED | OUTPATIENT
Start: 2017-11-30 | End: 2018-12-06 | Stop reason: SDUPTHER

## 2017-11-30 RX ORDER — HYDROCODONE BITARTRATE AND ACETAMINOPHEN 5; 325 MG/1; MG/1
1 TABLET ORAL
Qty: 15 TAB | Refills: 0 | Status: SHIPPED | OUTPATIENT
Start: 2017-11-30 | End: 2018-12-06

## 2017-11-30 NOTE — MR AVS SNAPSHOT
Visit Information Date & Time Provider Department Dept. Phone Encounter #  
 11/30/2017  1:00 PM Ese Marrero [phone] [id number] Follow-up Instructions Return in about 8 months (around 7/30/2018) for physical, labs at next visit. Upcoming Health Maintenance Date Due FOBT Q 1 YEAR AGE 50-75 5/27/2016 Influenza Age 5 to Adult 8/1/2017 BREAST CANCER SCRN MAMMOGRAM 6/2/2019 PAP AKA CERVICAL CYTOLOGY 7/24/2020 DTaP/Tdap/Td series (2 - Td) 10/4/2021 Allergies as of 11/30/2017  Review Complete On: 7/24/2017 By: Zoë Newton., DO Severity Noted Reaction Type Reactions Pcn [Penicillins] Low 01/13/2010   Side Effect Rash Current Immunizations  Reviewed on 10/25/2013 Name Date Influenza Vaccine 11/4/2014  3:20 PM, 10/25/2013 Influenza Vaccine (Quad) PF 11/11/2015 Influenza Vaccine Split 10/3/2012, 10/4/2011, 11/2/2010 TDAP Vaccine 10/4/2011 Not reviewed this visit You Were Diagnosed With   
  
 Codes Comments Essential hypertension, malignant    -  Primary ICD-10-CM: I10 
ICD-9-CM: 401.0 Heart murmur     ICD-10-CM: R01.1 ICD-9-CM: 785.2 Generalized abdominal pain     ICD-10-CM: R10.84 ICD-9-CM: 789.07 Vitals BP Pulse Temp Resp Height(growth percentile) Weight(growth percentile) 112/80 (BP 1 Location: Left arm, BP Patient Position: Sitting) 96 97.4 °F (36.3 °C) (Oral) 12 5' 5\" (1.651 m) 234 lb 9.6 oz (106.4 kg) SpO2 BMI OB Status Smoking Status 99% 39.04 kg/m2 Perimenopausal Never Smoker BMI and BSA Data Body Mass Index Body Surface Area 39.04 kg/m 2 2.21 m 2 Preferred Pharmacy Pharmacy Name Phone [facility] [address] Your Updated Medication List  
  
   
This list is accurate as of: 11/30/17  1:26 PM.  Always use your most recent med list.  
  
  
  
  
 HYDROcodone-acetaminophen 5-325 mg per tablet Commonly known as:  Thelanny Farah Take 1 Tab by mouth every eight (8) hours as needed for Pain. Max Daily Amount: 3 Tabs. lisinopril 10 mg tablet Commonly known as:  Freeman Paradise Take 1 Tab by mouth daily. naproxen sodium 550 mg tablet Commonly known as:  Mckenna Kimbrough Take 1 Tab by mouth two (2) times daily (with meals). Prescriptions Printed Refills HYDROcodone-acetaminophen (NORCO) 5-325 mg per tablet 0 Sig: Take 1 Tab by mouth every eight (8) hours as needed for Pain. Max Daily Amount: 3 Tabs. Class: Print Route: Oral  
  
Prescriptions Sent to Pharmacy Refills  
 naproxen sodium (ANAPROX) 550 mg tablet 1 Sig: Take 1 Tab by mouth two (2) times daily (with meals). Class: Normal  
 [facility] [place] [place] [address] Ph #: [phone] Route: Oral  
 lisinopril (PRINIVIL, ZESTRIL) 10 mg tablet 4 Sig: Take 1 Tab by mouth daily. Class: Normal  
 [facility] [place] [place] [address] Ph #: [phone] Route: Oral  
  
Follow-up Instructions Return in about 8 months (around 7/30/2018) for physical, labs at next visit. Introducing [facility]! Dear Marco Harp: Thank you for requesting a [employer] account. Our records indicate that you already have an active [employer] account. You can access your account anytime at [email or web address] Did you know that you can access your hospital and ER discharge instructions at any time in [employer]? You can also review all of your test results from your hospital stay or ER visit. Additional Information If you have questions, please visit the Frequently Asked Questions section of the [employer] website at [email or web address] Remember, [employer] is NOT to be used for urgent needs. For medical emergencies, dial 911. Now available from your iPhone and Android! Please provide this summary of care documentation to your next provider. Your primary care clinician is listed as [id number] [facility]. If you have any questions after today's visit, please call [phone].

## 2017-11-30 NOTE — PROGRESS NOTES
Ross Maddox is a 47 y.o. female presented to clinic for ultra sound results. Pt denies any pain or concerns at this time. 1. Have you been to the ER, urgent care clinic since your last visit? Hospitalized since your last visit? No    2. Have you seen or consulted any other health care providers outside of the [address] [place] [address] since your last visit? Include any pap smears or colon screening.  No     Learning Assessment 8/11/2015   PRIMARY LEARNER Patient   HIGHEST LEVEL OF EDUCATION - PRIMARY LEARNER  GRADUATED HIGH SCHOOL OR GED   BARRIERS PRIMARY LEARNER READING   CO-LEARNER CAREGIVER No   PRIMARY LANGUAGE ENGLISH   LEARNER PREFERENCE PRIMARY READING   ANSWERED BY self   RELATIONSHIP SELF

## 2017-11-30 NOTE — PROGRESS NOTES
Yanique Burch is a 47 y.o.  female and presents with    Chief Complaint   Patient presents with    Abdominal Pain     Got better. Then yesterday moved a mattress now in a lot of pain    Subjective: Additional Concerns:          Patient Active Problem List    Diagnosis Date Noted    Menopause 08/18/2015    Essential hypertension, malignant 10/03/2012    Screening for malignant neoplasm of the cervix 10/04/2011    Heart murmur 01/13/2010     Current Outpatient Prescriptions   Medication Sig Dispense Refill    naproxen sodium (ANAPROX) 550 mg tablet Take 1 Tab by mouth two (2) times daily (with meals). 10 Tab 1    HYDROcodone-acetaminophen (NORCO) 5-325 mg per tablet Take 1 Tab by mouth every eight (8) hours as needed for Pain. Max Daily Amount: 3 Tabs. 15 Tab 0    lisinopril (PRINIVIL, ZESTRIL) 10 mg tablet Take 1 Tab by mouth daily. 90 Tab 4     Allergies   Allergen Reactions    Pcn [Penicillins] Rash     Past Medical History:   Diagnosis Date    Essential hypertension, malignant 10/3/2012    Grandparents with Hx Breast, Prostate, Uterine and Thyroid Cancers 1/13/2010    Hx of Abnormal Pap distant 1/13/2010     Past Surgical History:   Procedure Laterality Date    HX DILATION AND CURETTAGE  4/2000 secondary to uterine fibroids    HX ORTHOPAEDIC  S/p L ankle Fx 1/2001     Family History   Problem Relation Age of Onset    Breast Cancer Paternal Grandmother 62     57s    Diabetes Mother     Heart Disease Father     Hypertension Father      Social History   Substance Use Topics    Smoking status: Never Smoker    Smokeless tobacco: Never Used    Alcohol use 0.0 oz/week     0 Standard drinks or equivalent per week       ROS       All other systems reviewed and are negative.       Objective:  Vitals:    11/30/17 1305   BP: 112/80   Pulse: 96   Resp: 12   Temp: 97.4 °F (36.3 °C)   TempSrc: Oral   SpO2: 99%   Weight: 234 lb 9.6 oz (106.4 kg)   Height: 5' 5\" (1.651 m)   PainSc:   0 - No pain alert, well appearing, and in no distress and oriented to person, place, and time  Chest - clear to auscultation, no wheezes, rales or rhonchi, symmetric air entry  Heart - normal rate, regular rhythm, normal S1, S2, no murmurs, rubs, clicks or gallops  Abdomen - soft, nontender, nondistended, no masses or organomegaly        LABS     TESTS    IMPRESSION  IMPRESSION:      No diagnostic sonographic abnormality seen. IUD present. Ovaries grossly  unremarkable. Study somewhat limited by bowel gas. Assessment/Plan:    Pelvic pain resolved  2. Ms pain will give a few more anaprox and /or vicodin and f/u prn    Lab review:         I have discussed the diagnosis with the patient and the intended plan as seen in the above orders. The patient has received an after-visit summary and questions were answered concerning future plans. I have discussed medication side effects and warnings with the patient as well. I have reviewed the plan of care with the patient, accepted their input and they are in agreement with the treatment goals.      Follow-up Disposition: Not on File

## 2018-12-05 ENCOUNTER — TELEPHONE (OUTPATIENT)
Dept: FAMILY MEDICINE CLINIC | Age: 53
End: 2018-12-05

## 2018-12-05 NOTE — TELEPHONE ENCOUNTER
Patient requesting a call back regarding gross hematuria x 1 day.   Declined being seen with walk in

## 2018-12-06 ENCOUNTER — HOSPITAL ENCOUNTER (OUTPATIENT)
Dept: LAB | Age: 53
Discharge: HOME | End: 2018-12-06
Payer: OTHER GOVERNMENT

## 2018-12-06 ENCOUNTER — OFFICE VISIT (OUTPATIENT)
Dept: FAMILY MEDICINE CLINIC | Age: 53
End: 2018-12-06

## 2018-12-06 VITALS
HEIGHT: 65 IN | RESPIRATION RATE: 19 BRPM | OXYGEN SATURATION: 99 % | DIASTOLIC BLOOD PRESSURE: 88 MMHG | SYSTOLIC BLOOD PRESSURE: 129 MMHG | BODY MASS INDEX: 36.72 KG/M2 | TEMPERATURE: 98.3 F | WEIGHT: 220.4 LBS | HEART RATE: 98 BPM

## 2018-12-06 DIAGNOSIS — R31.9 HEMATURIA, UNSPECIFIED TYPE: ICD-10-CM

## 2018-12-06 DIAGNOSIS — R30.9 PAINFUL URINATION: ICD-10-CM

## 2018-12-06 DIAGNOSIS — I10 ESSENTIAL HYPERTENSION, MALIGNANT: ICD-10-CM

## 2018-12-06 DIAGNOSIS — R31.9 HEMATURIA, UNSPECIFIED TYPE: Primary | ICD-10-CM

## 2018-12-06 PROBLEM — E66.01 SEVERE OBESITY (HCC): Status: ACTIVE | Noted: 2018-12-06

## 2018-12-06 LAB
APPEARANCE UR: ABNORMAL
BACTERIA URNS QL MICRO: ABNORMAL /HPF
BILIRUB UR QL STRIP: NEGATIVE
BILIRUB UR QL: NEGATIVE
COLOR UR: YELLOW
EPITH CASTS URNS QL MICRO: ABNORMAL /LPF (ref 0–5)
GLUCOSE UR STRIP.AUTO-MCNC: NEGATIVE MG/DL
GLUCOSE UR-MCNC: NEGATIVE MG/DL
HGB UR QL STRIP: ABNORMAL
KETONES P FAST UR STRIP-MCNC: NEGATIVE MG/DL
KETONES UR QL STRIP.AUTO: NEGATIVE MG/DL
LEUKOCYTE ESTERASE UR QL STRIP.AUTO: ABNORMAL
NITRITE UR QL STRIP.AUTO: NEGATIVE
PH UR STRIP: 5 [PH] (ref 5–8)
PH UR STRIP: 5.5 [PH] (ref 4.6–8)
PROT UR QL STRIP: NORMAL
PROT UR STRIP-MCNC: NEGATIVE MG/DL
RBC #/AREA URNS HPF: ABNORMAL /HPF (ref 0–5)
SP GR UR REFRACTOMETRY: 1.02 (ref 1–1.03)
SP GR UR STRIP: 1.02 (ref 1–1.03)
UA UROBILINOGEN AMB POC: NORMAL (ref 0.2–1)
URINALYSIS CLARITY POC: NORMAL
URINALYSIS COLOR POC: YELLOW
URINE BLOOD POC: NORMAL
URINE LEUKOCYTES POC: NORMAL
URINE NITRITES POC: NEGATIVE
UROBILINOGEN UR QL STRIP.AUTO: 0.2 EU/DL (ref 0.2–1)
WBC URNS QL MICRO: ABNORMAL /HPF (ref 0–4)

## 2018-12-06 PROCEDURE — 81001 URINALYSIS AUTO W/SCOPE: CPT

## 2018-12-06 PROCEDURE — 87086 URINE CULTURE/COLONY COUNT: CPT

## 2018-12-06 RX ORDER — CIPROFLOXACIN 250 MG/1
250 TABLET, FILM COATED ORAL EVERY 12 HOURS
Qty: 20 TAB | Refills: 0 | Status: SHIPPED | OUTPATIENT
Start: 2018-12-06 | End: 2018-12-07 | Stop reason: SINTOL

## 2018-12-06 RX ORDER — LISINOPRIL 10 MG/1
10 TABLET ORAL DAILY
Qty: 90 TAB | Refills: 4 | Status: SHIPPED | OUTPATIENT
Start: 2018-12-06

## 2018-12-06 NOTE — PROGRESS NOTES
Shayna Jorgensen is a 47 y.o.  female and presents with    Chief Complaint   Patient presents with    Bladder Infection           Subjective:    Cardiovascular Review:  The patient has hypertension. Diet and Lifestyle: not attempting to follow a low fat, low cholesterol diet, not attempting to follow a low sodium diet  Home BP Monitoring: is not measured at home. Pertinent ROS: taking medications as instructed, no medication side effects noted, no TIA's, no chest pain on exertion, no dyspnea on exertion, no swelling of ankles. uti symptoms x 1 days      Additional Concerns:          Patient Active Problem List    Diagnosis Date Noted    Severe obesity ([address] 12/06/2018    Menopause 08/18/2015    Essential hypertension, malignant 10/03/2012    Screening for malignant neoplasm of the cervix 10/04/2011    Heart murmur 01/13/2010     Current Outpatient Medications   Medication Sig Dispense Refill    lisinopril (PRINIVIL, ZESTRIL) 10 mg tablet Take 1 Tab by mouth daily. 90 Tab 4    ciprofloxacin HCl (CIPRO) 250 mg tablet Take 1 Tab by mouth every twelve (12) hours for 10 days. 20 Tab 0     Allergies   Allergen Reactions    Pcn [Penicillins] Rash     Past Medical History:   Diagnosis Date    Essential hypertension, malignant 10/3/2012    Grandparents with Hx Breast, Prostate, Uterine and Thyroid Cancers 1/13/2010    Hx of Abnormal Pap distant 1/13/2010     Past Surgical History:   Procedure Laterality Date    HX DILATION AND CURETTAGE  4/2000 secondary to uterine fibroids    HX ORTHOPAEDIC  S/p L ankle Fx 1/2001     Family History   Problem Relation Age of Onset    Breast Cancer Paternal Grandmother 63        61s    Diabetes Mother     Heart Disease Father     Hypertension Father      Social History     Tobacco Use    Smoking status: Never Smoker    Smokeless tobacco: Never Used   Substance Use Topics    Alcohol use:  Yes     Alcohol/week: 0.0 oz       ROS       All other systems reviewed and are negative. Objective:  Vitals:    12/06/18 1040   BP: 129/88   Pulse: 98   Resp: 19   Temp: 98.3 °F (36.8 °C)   TempSrc: Oral   SpO2: 99%   Weight: 220 lb 6.4 oz (100 kg)   Height: 5' 5\" (1.651 m)   PainSc:   1   PainLoc: Vagina   LMP: 04/06/2018                 alert, well appearing, and in no distress and oriented to person, place, and time  Chest - clear to auscultation, no wheezes, rales or rhonchi, symmetric air entry  Heart - normal rate, regular rhythm, normal S1, S2, no murmurs, rubs, clicks or gallops  Abdomen - soft, nontender, nondistended, no masses or organomegaly        LABS   ua cw inf  TESTS      Assessment/Plan:    Hypertension - stable  uti cover with cipro f/u ini   1wk  Check cx    Lab review:     Diagnoses and all orders for this visit:    1. Hematuria, unspecified type  -     CULTURE, URINE; Future  -     AMB POC URINALYSIS DIP STICK AUTO W/O MICRO    2. Painful urination  -     CULTURE, URINE; Future  -     AMB POC URINALYSIS DIP STICK AUTO W/O MICRO  -     LIPID PANEL; Future  -     CBC WITH AUTOMATED DIFF; Future  -     METABOLIC PANEL, COMPREHENSIVE; Future  -     URINALYSIS W/ RFLX MICROSCOPIC; Future  -     TSH 3RD GENERATION; Future  -     [facility] MAMMO BI SCREENING INCL CAD; Future  -     DEXA BONE DENSITY STUDY AXIAL; Future  -     VITAMIN D, 1, 25 DIHYDROXY; Future    3. Essential hypertension, malignant  -     lisinopril (PRINIVIL, ZESTRIL) 10 mg tablet; Take 1 Tab by mouth daily.  -     LIPID PANEL; Future  -     CBC WITH AUTOMATED DIFF; Future  -     METABOLIC PANEL, COMPREHENSIVE; Future  -     URINALYSIS W/ RFLX MICROSCOPIC; Future  -     TSH 3RD GENERATION; Future  -     [facility] MAMMO BI SCREENING INCL CAD; Future  -     DEXA BONE DENSITY STUDY AXIAL; Future  -     VITAMIN D, 1, 25 DIHYDROXY; Future    Other orders  -     ciprofloxacin HCl (CIPRO) 250 mg tablet; Take 1 Tab by mouth every twelve (12) hours for 10 days.           I have discussed the diagnosis with the patient and the intended plan as seen in the above orders. The patient has received an after-visit summary and questions were answered concerning future plans. I have discussed medication side effects and warnings with the patient as well. I have reviewed the plan of care with the patient, accepted their input and they are in agreement with the treatment goals. Follow-up Disposition:  Return in about 2 months (around 2/6/2019) for already scheduled, physical, labs prior, dexa prior, mammo prior, EKG next visit.   *

## 2018-12-06 NOTE — PROGRESS NOTES
Room #      SUBJECTIVE:    Tarik Khan is a 47 y.o. female who presents today for c/o blood in urine and painful urination    1. Have you been to the ER, urgent care clinic since your last visit? Hospitalized since your last visit? NO    2. Have you seen or consulted any other health care providers outside of the [address] [place] [address] since your last visit? Include any pap smears or colon screening. NO  When :  Reason:    Health Maintenance reviewed Yes    Health Maintenance Due   Topic Date Due    Shingrix Vaccine Age 50> (1 of 2) 05/27/2016    FOBT Q 1 YEAR AGE 50-75  05/27/2016    Influenza Age 9 to Adult  08/01/2018

## 2018-12-06 NOTE — TELEPHONE ENCOUNTER
Returned call to patient she stated that she was scheduled when she called.  Patient will be in the office at 10:30 am closing encounter

## 2018-12-07 ENCOUNTER — TELEPHONE (OUTPATIENT)
Dept: FAMILY MEDICINE CLINIC | Age: 53
End: 2018-12-07

## 2018-12-07 RX ORDER — SULFAMETHOXAZOLE AND TRIMETHOPRIM 800; 160 MG/1; MG/1
1 TABLET ORAL 2 TIMES DAILY
Qty: 20 TAB | Refills: 0 | Status: SHIPPED | OUTPATIENT
Start: 2018-12-07 | End: 2018-12-26 | Stop reason: SDUPTHER

## 2018-12-07 RX ORDER — SULFAMETHOXAZOLE AND TRIMETHOPRIM 800; 160 MG/1; MG/1
1 TABLET ORAL 2 TIMES DAILY
Qty: 20 TAB | Refills: 0 | Status: CANCELLED | OUTPATIENT
Start: 2018-12-07

## 2018-12-07 NOTE — TELEPHONE ENCOUNTER
Agree with stop med  rx for bactrim will be sent  Need a pharmacy to [place]   Notify me when pharmacy is in computer.   Dr Crump Click

## 2018-12-07 NOTE — TELEPHONE ENCOUNTER
Returned call to Ms Amina Gomez to let her know that Dr. Kana Curtis will be sending rx for bactrim, to her local pharmacy [facility]. Obtained verbal read-back and explained to her that if she experienced any allergic reactions to immediately stop all medications and contact our office.

## 2018-12-07 NOTE — TELEPHONE ENCOUNTER
Received a call from Ms Price Nap she reports that she believes she is having an allergic reaction to the antibiotic medication that was prescribed yesterday. Patient reports that when she took it both times she experienced a heavy tightness to her chest and that it felt like it was caving in on her. Advised patient to immediately stop taking mediation and I will consult with Dr. Mona Jackson on this. Please advise. Patient request another medication change

## 2018-12-08 LAB
BACTERIA SPEC CULT: NORMAL
SERVICE CMNT-IMP: NORMAL

## 2018-12-24 ENCOUNTER — TELEPHONE (OUTPATIENT)
Dept: FAMILY MEDICINE CLINIC | Age: 53
End: 2018-12-24

## 2018-12-24 NOTE — TELEPHONE ENCOUNTER
Patient called stating she was seen on Dec. 6 and was given an antibiotic for UTI. Has finished the medication and the symptoms have returned. Patient made an appointment with Dr. Tahir Borjas for Dec. 26 at 9:00 am. Patient told if she feels worst to go to [facility] [place] [facility].

## 2018-12-26 ENCOUNTER — HOSPITAL ENCOUNTER (OUTPATIENT)
Dept: LAB | Age: 53
Discharge: HOME | End: 2018-12-26
Payer: OTHER GOVERNMENT

## 2018-12-26 ENCOUNTER — OFFICE VISIT (OUTPATIENT)
Dept: FAMILY MEDICINE CLINIC | Age: 53
End: 2018-12-26

## 2018-12-26 VITALS
WEIGHT: 219.4 LBS | TEMPERATURE: 98.7 F | DIASTOLIC BLOOD PRESSURE: 85 MMHG | BODY MASS INDEX: 36.55 KG/M2 | HEART RATE: 104 BPM | RESPIRATION RATE: 20 BRPM | HEIGHT: 65 IN | OXYGEN SATURATION: 99 % | SYSTOLIC BLOOD PRESSURE: 123 MMHG

## 2018-12-26 DIAGNOSIS — N39.0 UTI (URINARY TRACT INFECTION), UNCOMPLICATED: ICD-10-CM

## 2018-12-26 DIAGNOSIS — R30.0 BURNING WITH URINATION: Primary | ICD-10-CM

## 2018-12-26 LAB
BILIRUB UR QL STRIP: NORMAL
GLUCOSE UR-MCNC: NEGATIVE MG/DL
KETONES P FAST UR STRIP-MCNC: NEGATIVE MG/DL
PH UR STRIP: 5.5 [PH] (ref 4.6–8)
PROT UR QL STRIP: NORMAL
SP GR UR STRIP: 1.03 (ref 1–1.03)
UA UROBILINOGEN AMB POC: NORMAL (ref 0.2–1)
URINALYSIS CLARITY POC: NORMAL
URINALYSIS COLOR POC: YELLOW
URINE BLOOD POC: NORMAL
URINE LEUKOCYTES POC: NORMAL
URINE NITRITES POC: NEGATIVE

## 2018-12-26 PROCEDURE — 87086 URINE CULTURE/COLONY COUNT: CPT

## 2018-12-26 RX ORDER — SULFAMETHOXAZOLE AND TRIMETHOPRIM 800; 160 MG/1; MG/1
1 TABLET ORAL 2 TIMES DAILY
Qty: 10 TAB | Refills: 0 | Status: SHIPPED | OUTPATIENT
Start: 2018-12-26 | End: 2018-12-31

## 2018-12-26 NOTE — PROGRESS NOTES
History of Present Illness  Leila Hoskins is a 47 y.o. female who presents today for management of    Chief Complaint   Patient presents with    Urinary Burning       Urinary Tract Infection  Patient complains of dysuria. Onset was 5 days ago, unchanged since that time. Patient denies back pain, congestion, fever, stomach ache and vaginal discharge. There is not any concern of sexual abuse. There is not a history of trauma to the genital area. Patient does not have a history of recurrent UTI. Patient does not have a history of pyelonephritis. Patient was seen by PCP 3 weeks ago for a UTI. She completed 10 days of Bactrim and symptoms resolved, but recurred after 4 days. Problem List  Patient Active Problem List    Diagnosis Date Noted    Severe obesity ([facility] [place] [address] 12/06/2018    Menopause 08/18/2015    Essential hypertension, malignant 10/03/2012    Screening for malignant neoplasm of the cervix 10/04/2011    Heart murmur 01/13/2010       Past Medical History  Past Medical History:   Diagnosis Date    Essential hypertension, malignant 10/3/2012    Grandparents with Hx Breast, Prostate, Uterine and Thyroid Cancers 1/13/2010    Hx of Abnormal Pap distant 1/13/2010        Surgical History  Past Surgical History:   Procedure Laterality Date    HX DILATION AND CURETTAGE  4/2000 secondary to uterine fibroids    HX ORTHOPAEDIC  S/p L ankle Fx 1/2001        Current Medications  Current Outpatient Medications   Medication Sig    trimethoprim-sulfamethoxazole (BACTRIM DS, SEPTRA DS) 160-800 mg per tablet Take 1 Tab by mouth two (2) times a day for 5 days.  lisinopril (PRINIVIL, ZESTRIL) 10 mg tablet Take 1 Tab by mouth daily. No current facility-administered medications for this visit.         Allergies/Drug Reactions  Allergies   Allergen Reactions    Ciprofloxacin Palpitations and Other (comments)    Pcn [Penicillins] Rash        Family History  Family History   Problem Relation Age of Onset    Breast Cancer Paternal Grandmother 63        61s    Diabetes Mother     Heart Disease Father     Hypertension Father         Social History  Social History     Socioeconomic History    Marital status:      Spouse name: Not on file    Number of children: Not on file    Years of education: Not on file    Highest education level: Not on file   Social Needs    Financial resource strain: Not on file    Food insecurity - worry: Not on file    Food insecurity - inability: Not on file   [employer] needs - medical: Not on file   [employer] needs - non-medical: Not on file   Occupational History    Not on file   Tobacco Use    Smoking status: Never Smoker    Smokeless tobacco: Never Used   Substance and Sexual Activity    Alcohol use: Yes     Alcohol/week: 0.0 oz    Drug use: No    Sexual activity: Yes   Other Topics Concern    Not on file   Social History Narrative    Not on file       Review of Systems  Negative except as mentioned in HPI      Physical Exam  Vital signs:   Vitals:    12/26/18 0906   BP: 123/85   Pulse: (!) 104   Resp: 20   Temp: 98.7 °F (37.1 °C)   TempSrc: Oral   SpO2: 99%   Weight: 219 lb 6.4 oz (99.5 kg)   Height: 5' 5\" (1.651 m)       General: alert, oriented, not in distress  Eyes: clear conjunctivae, anicteric sclerae, full and equal ROMs  Abdomen: soft, non-distended, non-tender, normal bowel sounds, no organomegaly, no masses  Extremities: no focal deformities, no edema  Neuro: AAOx3, CN's grossly intact  Skin: no visible abnormalities    Laboratory/Tests:  POC Urinalysis  Leucocyte esterase trace (large 3 weeks ago)  Blood trace (large 3 weeks ago)  Protein trace    URINE CULTURE 12/8/18  Component Value Ref Range & Units Status   Special Requests: NO SPECIAL REQUESTS    Final   Culture result:    Final   [id number]   COLONIES/mL   MIXED GRAM POSITIVE CHRISTEN, PROBABLE SKIN/GENITAL CONTAMINATION. Assessment/Plan:      1.  Burning with urination  - AMB POC URINALYSIS DIP STICK AUTO W/O MICRO    2. UTI (urinary tract infection), uncomplicated  - will give another 5 days of trimethoprim-sulfamethoxazole (BACTRIM DS, SEPTRA DS) 160-800 mg per tablet; Take 1 Tab by mouth two (2) times a day for 5 days. Dispense: 10 Tab; Refill: 0  - CULTURE, URINE; Future      Follow-up Disposition:  Return if symptoms worsen or fail to improve. I have discussed the diagnosis with the patient and the intended plan as seen in the above orders. The patient has received an after-visit summary and questions were answered concerning future plans. I have discussed medication side effects and warnings with the patient as well. I have reviewed the plan of care with the patient, accepted their input and they are in agreement with the treatment goals.        Tutu Prater MD  December 26, 2018

## 2018-12-26 NOTE — PROGRESS NOTES
1. Have you been to the ER, urgent care clinic since your last visit? Hospitalized since your last visit? No    2. Have you seen or consulted any other health care providers outside of the [address] [place] [address] since your last visit? Include any pap smears or colon screening.  No

## 2018-12-28 LAB
BACTERIA SPEC CULT: NORMAL
SERVICE CMNT-IMP: NORMAL

## 2019-03-11 ENCOUNTER — HOSPITAL ENCOUNTER (OUTPATIENT)
Dept: GENERAL RADIOLOGY | Age: 54
Discharge: HOME | End: 2019-03-11
Attending: FAMILY MEDICINE
Payer: OTHER GOVERNMENT

## 2019-03-11 ENCOUNTER — HOSPITAL ENCOUNTER (OUTPATIENT)
Dept: MAMMOGRAPHY | Age: 54
Discharge: HOME | End: 2019-03-11
Attending: FAMILY MEDICINE
Payer: OTHER GOVERNMENT

## 2019-03-11 DIAGNOSIS — R30.9 PAINFUL URINATION: ICD-10-CM

## 2019-03-11 DIAGNOSIS — I10 ESSENTIAL HYPERTENSION, MALIGNANT: ICD-10-CM

## 2019-03-11 PROCEDURE — 77080 DXA BONE DENSITY AXIAL: CPT

## 2019-03-11 PROCEDURE — 77063 BREAST TOMOSYNTHESIS BI: CPT
